# Patient Record
Sex: FEMALE | Race: WHITE | NOT HISPANIC OR LATINO | Employment: FULL TIME | URBAN - METROPOLITAN AREA
[De-identification: names, ages, dates, MRNs, and addresses within clinical notes are randomized per-mention and may not be internally consistent; named-entity substitution may affect disease eponyms.]

---

## 2017-04-06 ENCOUNTER — APPOINTMENT (OUTPATIENT)
Dept: PREADMISSION TESTING | Facility: HOSPITAL | Age: 47
End: 2017-04-06

## 2017-04-06 VITALS
WEIGHT: 160 LBS | BODY MASS INDEX: 28.35 KG/M2 | RESPIRATION RATE: 16 BRPM | HEIGHT: 63 IN | DIASTOLIC BLOOD PRESSURE: 78 MMHG | SYSTOLIC BLOOD PRESSURE: 123 MMHG | HEART RATE: 86 BPM | OXYGEN SATURATION: 99 % | TEMPERATURE: 98 F

## 2017-04-06 LAB
ANION GAP SERPL CALCULATED.3IONS-SCNC: 15.5 MMOL/L
BUN BLD-MCNC: 13 MG/DL (ref 6–20)
BUN/CREAT SERPL: 25 (ref 7–25)
CALCIUM SPEC-SCNC: 9.2 MG/DL (ref 8.6–10.5)
CHLORIDE SERPL-SCNC: 101 MMOL/L (ref 98–107)
CO2 SERPL-SCNC: 23.5 MMOL/L (ref 22–29)
CREAT BLD-MCNC: 0.52 MG/DL (ref 0.57–1)
DEPRECATED RDW RBC AUTO: 41.8 FL (ref 37–54)
ERYTHROCYTE [DISTWIDTH] IN BLOOD BY AUTOMATED COUNT: 12.4 % (ref 11.7–13)
GFR SERPL CREATININE-BSD FRML MDRD: 127 ML/MIN/1.73
GLUCOSE BLD-MCNC: 93 MG/DL (ref 65–99)
HCT VFR BLD AUTO: 39.3 % (ref 35.6–45.5)
HGB BLD-MCNC: 12.8 G/DL (ref 11.9–15.5)
MCH RBC QN AUTO: 30 PG (ref 26.9–32)
MCHC RBC AUTO-ENTMCNC: 32.6 G/DL (ref 32.4–36.3)
MCV RBC AUTO: 92.3 FL (ref 80.5–98.2)
PLATELET # BLD AUTO: 267 10*3/MM3 (ref 140–500)
PMV BLD AUTO: 9.6 FL (ref 6–12)
POTASSIUM BLD-SCNC: 3.8 MMOL/L (ref 3.5–5.2)
RBC # BLD AUTO: 4.26 10*6/MM3 (ref 3.9–5.2)
SODIUM BLD-SCNC: 140 MMOL/L (ref 136–145)
WBC NRBC COR # BLD: 6.88 10*3/MM3 (ref 4.5–10.7)

## 2017-04-06 PROCEDURE — 36415 COLL VENOUS BLD VENIPUNCTURE: CPT

## 2017-04-06 PROCEDURE — 85027 COMPLETE CBC AUTOMATED: CPT | Performed by: OTOLARYNGOLOGY

## 2017-04-06 PROCEDURE — 80048 BASIC METABOLIC PNL TOTAL CA: CPT | Performed by: OTOLARYNGOLOGY

## 2017-04-06 RX ORDER — METOPROLOL SUCCINATE 50 MG/1
100 TABLET, EXTENDED RELEASE ORAL EVERY MORNING
COMMUNITY

## 2017-04-06 RX ORDER — OMEPRAZOLE 20 MG/1
40 CAPSULE, DELAYED RELEASE ORAL EVERY MORNING
COMMUNITY

## 2017-04-06 NOTE — DISCHARGE INSTRUCTIONS
Take the following medications the morning of surgery with a small sip of water:    METOPROLOL  PRILOSEC      General Instructions:  • Do not eat solid food after midnight the night before surgery.  • You may drink clear liquids the day of surgery prior to leaving your house for the hospital.  Do not have anything once you have left your house for the hospital.  • You are to have nothing to drink two hours before surgery.  • Clear liquids are liquids you can see through. Nothing red in color.  Plain water    Sports drinks  Sodas     Gelatin (Jell-O)  Fruit juices without pulp such as white grape juice and apple juice  Popsicles that contain no fruit or yogurt  Tea or coffee (no cream or milk added)    • It is beneficial for you to have a clear drink that contains carbohydrates just before you leave your house and before your fasting time begins.  We suggest a 20 ounce bottle of Gatorade or Powerade for non-diabetic patients or a 20 ounce bottle of G2 or Powerade Zero for diabetic patients. (Pediatric patients will not be advised to drink a 20 ounce carbohydrate drink)  • Patients who avoid smoking, chewing tobacco and alcohol for 4 weeks prior to surgery have a reduced risk of post-operative complications.  Quit smoking as many days before surgery as you can.  • Do not smoke, use chewing tobacco or drink alcohol the day of surgery.   • Bring any papers given to you in the doctor’s office.  • Wear clean comfortable clothes and socks.  • Do not wear contact lenses or make-up.  Bring a case for your glasses.   • Bring crutches or walker if applicable.  • Leave all other valuables and jewelry at home.  • The Pre-Admission Testing nurse will instruct you to bring medications if unable to obtain an accurate list in Pre-Admission Testing.      Preventing a Surgical Site Infection:  • For 2 to 3 days before surgery, avoid shaving with a razor because the razor can irritate skin and make it easier to develop an  infection.  • The night prior to surgery sleep in a clean bed with clean clothing.  Do not allow pets to sleep with you.  • Shower on the morning of surgery using a fresh bar of anti-bacterial soap (such as Dial) and clean washcloth.  Dry with a clean towel and dress in clean clothing.  • Ask your surgeon if you will be receiving antibiotics prior to surgery.  • Make sure you, your family, and all healthcare providers clean their hands with soap and water or an alcohol based hand  before caring for you or your wound.    Day of surgery: 04- YOU WILL BE CALLED PRIOR TO SURGERY WITH AN ARRIVAL TIME--REPORT TO THE [facility] @ DESIGNATED TIME  Upon arrival, a Pre-op nurse and Anesthesiologist will review your health history, obtain vital signs, and answer questions you may have.  The only belongings needed at this time will be your home medications and if applicable your C-PAP/BI-PAP machine.  If you are staying overnight your family can leave the rest of your belongings in the car and bring them to your room later.  A Pre-op nurse will start an IV and you may receive medication in preparation for surgery, including something to help you relax.  Your family will be able to see you in the Pre-op area.  While you are in surgery your family should notify the waiting room  if they leave the waiting room area and provide a contact phone number.    Please be aware that surgery does come with discomfort.  We want to make every effort to control your discomfort so please discuss any uncontrolled symptoms with your nurse.   Your doctor will most likely have prescribed pain medications.      If you are going home after surgery you will receive individualized written care instructions before being discharged.  A responsible adult must drive you to and from the hospital on the day of your surgery and stay with you for 24 hours.    If you are staying overnight following surgery, you will be transported to your  hospital room following the recovery period.  [facility] has all private rooms.    If you have any questions please call Pre-Admission Testing at [phone].  Deductibles and co-payments are collected on the day of service. Please be prepared to pay the required co-pay, deductible or deposit on the day of service as defined by your plan.

## 2017-04-13 ENCOUNTER — HOSPITAL ENCOUNTER (OUTPATIENT)
Facility: HOSPITAL | Age: 47
Setting detail: OBSERVATION
Discharge: HOME | End: 2017-04-14
Attending: OTOLARYNGOLOGY | Admitting: OTOLARYNGOLOGY

## 2017-04-13 ENCOUNTER — ANESTHESIA (OUTPATIENT)
Dept: PERIOP | Facility: HOSPITAL | Age: 47
End: 2017-04-13

## 2017-04-13 ENCOUNTER — ANESTHESIA EVENT (OUTPATIENT)
Dept: PERIOP | Facility: HOSPITAL | Age: 47
End: 2017-04-13

## 2017-04-13 DIAGNOSIS — E04.1 THYROID NODULE: ICD-10-CM

## 2017-04-13 PROCEDURE — G0378 HOSPITAL OBSERVATION PER HR: HCPCS

## 2017-04-13 PROCEDURE — 25010000002 PROMETHAZINE PER 50 MG: Performed by: ANESTHESIOLOGY

## 2017-04-13 PROCEDURE — 88331 PATH CONSLTJ SURG 1 BLK 1SPC: CPT | Performed by: OTOLARYNGOLOGY

## 2017-04-13 PROCEDURE — 25010000002 PROPOFOL 10 MG/ML EMULSION: Performed by: NURSE ANESTHETIST, CERTIFIED REGISTERED

## 2017-04-13 PROCEDURE — 25010000002 HYDROMORPHONE PER 4 MG: Performed by: ANESTHESIOLOGY

## 2017-04-13 PROCEDURE — 25010000002 MIDAZOLAM PER 1 MG: Performed by: ANESTHESIOLOGY

## 2017-04-13 PROCEDURE — 25010000002 DEXAMETHASONE PER 1 MG: Performed by: NURSE ANESTHETIST, CERTIFIED REGISTERED

## 2017-04-13 PROCEDURE — 88307 TISSUE EXAM BY PATHOLOGIST: CPT | Performed by: OTOLARYNGOLOGY

## 2017-04-13 PROCEDURE — 25010000002 FENTANYL CITRATE (PF) 100 MCG/2ML SOLUTION: Performed by: NURSE ANESTHETIST, CERTIFIED REGISTERED

## 2017-04-13 PROCEDURE — 25010000002 NEOSTIGMINE 10 MG/10ML SOLUTION: Performed by: NURSE ANESTHETIST, CERTIFIED REGISTERED

## 2017-04-13 PROCEDURE — 25010000002 HYDROMORPHONE PER 4 MG: Performed by: NURSE ANESTHETIST, CERTIFIED REGISTERED

## 2017-04-13 PROCEDURE — 25010000003 AMPICILLIN-SULBACTAM PER 1.5 G: Performed by: NURSE ANESTHETIST, CERTIFIED REGISTERED

## 2017-04-13 RX ORDER — GLYCOPYRROLATE 0.2 MG/ML
INJECTION INTRAMUSCULAR; INTRAVENOUS AS NEEDED
Status: DISCONTINUED | OUTPATIENT
Start: 2017-04-13 | End: 2017-04-13 | Stop reason: SURG

## 2017-04-13 RX ORDER — HYDROCODONE BITARTRATE AND ACETAMINOPHEN 7.5; 325 MG/1; MG/1
1 TABLET ORAL EVERY 4 HOURS PRN
Status: DISCONTINUED | OUTPATIENT
Start: 2017-04-13 | End: 2017-04-14 | Stop reason: HOSPADM

## 2017-04-13 RX ORDER — OXYCODONE HYDROCHLORIDE AND ACETAMINOPHEN 5; 325 MG/1; MG/1
1 TABLET ORAL ONCE AS NEEDED
Status: DISCONTINUED | OUTPATIENT
Start: 2017-04-13 | End: 2017-04-13 | Stop reason: HOSPADM

## 2017-04-13 RX ORDER — NALBUPHINE HCL 10 MG/ML
10 AMPUL (ML) INJECTION EVERY 4 HOURS PRN
Status: DISCONTINUED | OUTPATIENT
Start: 2017-04-13 | End: 2017-04-13 | Stop reason: HOSPADM

## 2017-04-13 RX ORDER — PROMETHAZINE HYDROCHLORIDE 25 MG/ML
12.5 INJECTION, SOLUTION INTRAMUSCULAR; INTRAVENOUS EVERY 6 HOURS PRN
Status: DISCONTINUED | OUTPATIENT
Start: 2017-04-13 | End: 2017-04-14 | Stop reason: HOSPADM

## 2017-04-13 RX ORDER — DEXAMETHASONE SODIUM PHOSPHATE 10 MG/ML
INJECTION INTRAMUSCULAR; INTRAVENOUS AS NEEDED
Status: DISCONTINUED | OUTPATIENT
Start: 2017-04-13 | End: 2017-04-13 | Stop reason: SURG

## 2017-04-13 RX ORDER — FENTANYL CITRATE 50 UG/ML
INJECTION, SOLUTION INTRAMUSCULAR; INTRAVENOUS AS NEEDED
Status: DISCONTINUED | OUTPATIENT
Start: 2017-04-13 | End: 2017-04-13 | Stop reason: SURG

## 2017-04-13 RX ORDER — HYDROMORPHONE HCL 110MG/55ML
PATIENT CONTROLLED ANALGESIA SYRINGE INTRAVENOUS AS NEEDED
Status: DISCONTINUED | OUTPATIENT
Start: 2017-04-13 | End: 2017-04-13 | Stop reason: SURG

## 2017-04-13 RX ORDER — PANTOPRAZOLE SODIUM 40 MG/1
40 TABLET, DELAYED RELEASE ORAL
Status: DISCONTINUED | OUTPATIENT
Start: 2017-04-14 | End: 2017-04-14 | Stop reason: HOSPADM

## 2017-04-13 RX ORDER — NALBUPHINE HCL 10 MG/ML
2 AMPUL (ML) INJECTION EVERY 4 HOURS PRN
Status: DISCONTINUED | OUTPATIENT
Start: 2017-04-13 | End: 2017-04-13 | Stop reason: HOSPADM

## 2017-04-13 RX ORDER — FENTANYL CITRATE 50 UG/ML
50 INJECTION, SOLUTION INTRAMUSCULAR; INTRAVENOUS
Status: DISCONTINUED | OUTPATIENT
Start: 2017-04-13 | End: 2017-04-13 | Stop reason: HOSPADM

## 2017-04-13 RX ORDER — LIDOCAINE HYDROCHLORIDE 10 MG/ML
5 INJECTION, SOLUTION EPIDURAL; INFILTRATION; INTRACAUDAL; PERINEURAL ONCE
Status: DISCONTINUED | OUTPATIENT
Start: 2017-04-13 | End: 2017-04-13 | Stop reason: HOSPADM

## 2017-04-13 RX ORDER — HYDROMORPHONE HYDROCHLORIDE 1 MG/ML
0.5 INJECTION, SOLUTION INTRAMUSCULAR; INTRAVENOUS; SUBCUTANEOUS
Status: DISCONTINUED | OUTPATIENT
Start: 2017-04-13 | End: 2017-04-13 | Stop reason: HOSPADM

## 2017-04-13 RX ORDER — NALOXONE HCL 0.4 MG/ML
0.4 VIAL (ML) INJECTION AS NEEDED
Status: DISCONTINUED | OUTPATIENT
Start: 2017-04-13 | End: 2017-04-13 | Stop reason: HOSPADM

## 2017-04-13 RX ORDER — PROMETHAZINE HYDROCHLORIDE 25 MG/1
25 SUPPOSITORY RECTAL EVERY 4 HOURS PRN
Status: DISCONTINUED | OUTPATIENT
Start: 2017-04-13 | End: 2017-04-14 | Stop reason: HOSPADM

## 2017-04-13 RX ORDER — SODIUM CHLORIDE, SODIUM LACTATE, POTASSIUM CHLORIDE, CALCIUM CHLORIDE 600; 310; 30; 20 MG/100ML; MG/100ML; MG/100ML; MG/100ML
9 INJECTION, SOLUTION INTRAVENOUS CONTINUOUS
Status: DISCONTINUED | OUTPATIENT
Start: 2017-04-13 | End: 2017-04-14 | Stop reason: HOSPADM

## 2017-04-13 RX ORDER — SODIUM CHLORIDE 0.9 % (FLUSH) 0.9 %
1-10 SYRINGE (ML) INJECTION AS NEEDED
Status: DISCONTINUED | OUTPATIENT
Start: 2017-04-13 | End: 2017-04-14 | Stop reason: HOSPADM

## 2017-04-13 RX ORDER — ACETAMINOPHEN 325 MG/1
650 TABLET ORAL ONCE
Status: COMPLETED | OUTPATIENT
Start: 2017-04-13 | End: 2017-04-13

## 2017-04-13 RX ORDER — LIDOCAINE HYDROCHLORIDE 20 MG/ML
INJECTION, SOLUTION INFILTRATION; PERINEURAL AS NEEDED
Status: DISCONTINUED | OUTPATIENT
Start: 2017-04-13 | End: 2017-04-13 | Stop reason: SURG

## 2017-04-13 RX ORDER — ROCURONIUM BROMIDE 10 MG/ML
INJECTION, SOLUTION INTRAVENOUS AS NEEDED
Status: DISCONTINUED | OUTPATIENT
Start: 2017-04-13 | End: 2017-04-13 | Stop reason: SURG

## 2017-04-13 RX ORDER — MAGNESIUM HYDROXIDE 1200 MG/15ML
LIQUID ORAL AS NEEDED
Status: DISCONTINUED | OUTPATIENT
Start: 2017-04-13 | End: 2017-04-13 | Stop reason: HOSPADM

## 2017-04-13 RX ORDER — MIDAZOLAM HYDROCHLORIDE 1 MG/ML
2 INJECTION INTRAMUSCULAR; INTRAVENOUS
Status: DISCONTINUED | OUTPATIENT
Start: 2017-04-13 | End: 2017-04-13 | Stop reason: HOSPADM

## 2017-04-13 RX ORDER — DIPHENHYDRAMINE HYDROCHLORIDE 50 MG/ML
12.5 INJECTION INTRAMUSCULAR; INTRAVENOUS
Status: DISCONTINUED | OUTPATIENT
Start: 2017-04-13 | End: 2017-04-13 | Stop reason: HOSPADM

## 2017-04-13 RX ORDER — ACETAMINOPHEN 650 MG/1
650 SUPPOSITORY RECTAL ONCE AS NEEDED
Status: DISCONTINUED | OUTPATIENT
Start: 2017-04-13 | End: 2017-04-13 | Stop reason: HOSPADM

## 2017-04-13 RX ORDER — HYDRALAZINE HYDROCHLORIDE 20 MG/ML
5 INJECTION INTRAMUSCULAR; INTRAVENOUS
Status: DISCONTINUED | OUTPATIENT
Start: 2017-04-13 | End: 2017-04-13 | Stop reason: HOSPADM

## 2017-04-13 RX ORDER — PROMETHAZINE HYDROCHLORIDE 25 MG/ML
6.25 INJECTION, SOLUTION INTRAMUSCULAR; INTRAVENOUS ONCE AS NEEDED
Status: COMPLETED | OUTPATIENT
Start: 2017-04-13 | End: 2017-04-13

## 2017-04-13 RX ORDER — FAMOTIDINE 10 MG/ML
20 INJECTION, SOLUTION INTRAVENOUS ONCE
Status: COMPLETED | OUTPATIENT
Start: 2017-04-13 | End: 2017-04-13

## 2017-04-13 RX ORDER — MIDAZOLAM HYDROCHLORIDE 1 MG/ML
1 INJECTION INTRAMUSCULAR; INTRAVENOUS
Status: DISCONTINUED | OUTPATIENT
Start: 2017-04-13 | End: 2017-04-13 | Stop reason: HOSPADM

## 2017-04-13 RX ORDER — NEOSTIGMINE METHYLSULFATE 1 MG/ML
INJECTION, SOLUTION INTRAVENOUS AS NEEDED
Status: DISCONTINUED | OUTPATIENT
Start: 2017-04-13 | End: 2017-04-13 | Stop reason: SURG

## 2017-04-13 RX ORDER — SODIUM CHLORIDE 0.9 % (FLUSH) 0.9 %
1-10 SYRINGE (ML) INJECTION AS NEEDED
Status: DISCONTINUED | OUTPATIENT
Start: 2017-04-13 | End: 2017-04-13 | Stop reason: HOSPADM

## 2017-04-13 RX ORDER — AMPICILLIN SODIUM AND SULBACTAM SODIUM 2; 1 G/1; G/1
INJECTION, POWDER, FOR SOLUTION INTRAVENOUS AS NEEDED
Status: DISCONTINUED | OUTPATIENT
Start: 2017-04-13 | End: 2017-04-13 | Stop reason: SURG

## 2017-04-13 RX ORDER — PROMETHAZINE HYDROCHLORIDE 25 MG/1
25 SUPPOSITORY RECTAL ONCE AS NEEDED
Status: COMPLETED | OUTPATIENT
Start: 2017-04-13 | End: 2017-04-13

## 2017-04-13 RX ORDER — BACITRACIN ZINC 500 [USP'U]/G
OINTMENT TOPICAL 3 TIMES DAILY
Status: DISCONTINUED | OUTPATIENT
Start: 2017-04-13 | End: 2017-04-14 | Stop reason: HOSPADM

## 2017-04-13 RX ORDER — METOPROLOL SUCCINATE 50 MG/1
50 TABLET, EXTENDED RELEASE ORAL EVERY MORNING
Status: DISCONTINUED | OUTPATIENT
Start: 2017-04-14 | End: 2017-04-14 | Stop reason: HOSPADM

## 2017-04-13 RX ORDER — PROMETHAZINE HYDROCHLORIDE 25 MG/1
25 TABLET ORAL EVERY 4 HOURS PRN
Status: DISCONTINUED | OUTPATIENT
Start: 2017-04-13 | End: 2017-04-14 | Stop reason: HOSPADM

## 2017-04-13 RX ORDER — PROPOFOL 10 MG/ML
VIAL (ML) INTRAVENOUS AS NEEDED
Status: DISCONTINUED | OUTPATIENT
Start: 2017-04-13 | End: 2017-04-13 | Stop reason: SURG

## 2017-04-13 RX ORDER — LIDOCAINE HYDROCHLORIDE AND EPINEPHRINE 10; 10 MG/ML; UG/ML
INJECTION, SOLUTION INFILTRATION; PERINEURAL AS NEEDED
Status: DISCONTINUED | OUTPATIENT
Start: 2017-04-13 | End: 2017-04-13 | Stop reason: HOSPADM

## 2017-04-13 RX ORDER — PROMETHAZINE HYDROCHLORIDE 25 MG/1
25 TABLET ORAL ONCE AS NEEDED
Status: COMPLETED | OUTPATIENT
Start: 2017-04-13 | End: 2017-04-13

## 2017-04-13 RX ORDER — ACETAMINOPHEN 325 MG/1
650 TABLET ORAL ONCE AS NEEDED
Status: DISCONTINUED | OUTPATIENT
Start: 2017-04-13 | End: 2017-04-13 | Stop reason: HOSPADM

## 2017-04-13 RX ORDER — NALOXONE HCL 0.4 MG/ML
0.4 VIAL (ML) INJECTION
Status: DISCONTINUED | OUTPATIENT
Start: 2017-04-13 | End: 2017-04-14 | Stop reason: HOSPADM

## 2017-04-13 RX ADMIN — AMPICILLIN SODIUM AND SULBACTAM SODIUM 3 G: 2; 1 INJECTION, POWDER, FOR SOLUTION INTRAVENOUS at 13:50

## 2017-04-13 RX ADMIN — SODIUM CHLORIDE, POTASSIUM CHLORIDE, SODIUM LACTATE AND CALCIUM CHLORIDE 9 ML/HR: 600; 310; 30; 20 INJECTION, SOLUTION INTRAVENOUS at 11:41

## 2017-04-13 RX ADMIN — BACITRACIN ZINC: 500 OINTMENT TOPICAL at 23:35

## 2017-04-13 RX ADMIN — MIDAZOLAM 2 MG: 1 INJECTION INTRAMUSCULAR; INTRAVENOUS at 11:56

## 2017-04-13 RX ADMIN — FENTANYL CITRATE 100 MCG: 50 INJECTION INTRAMUSCULAR; INTRAVENOUS at 12:32

## 2017-04-13 RX ADMIN — GLYCOPYRROLATE 0.4 MG: 0.2 INJECTION INTRAMUSCULAR; INTRAVENOUS at 14:10

## 2017-04-13 RX ADMIN — FENTANYL CITRATE 50 MCG: 50 INJECTION INTRAMUSCULAR; INTRAVENOUS at 13:18

## 2017-04-13 RX ADMIN — SODIUM CHLORIDE, POTASSIUM CHLORIDE, SODIUM LACTATE AND CALCIUM CHLORIDE: 600; 310; 30; 20 INJECTION, SOLUTION INTRAVENOUS at 13:50

## 2017-04-13 RX ADMIN — PROPOFOL 160 MG: 10 INJECTION, EMULSION INTRAVENOUS at 12:32

## 2017-04-13 RX ADMIN — ROCURONIUM BROMIDE 20 MG: 10 INJECTION INTRAVENOUS at 13:20

## 2017-04-13 RX ADMIN — SODIUM CHLORIDE, POTASSIUM CHLORIDE, SODIUM LACTATE AND CALCIUM CHLORIDE 9 ML/HR: 600; 310; 30; 20 INJECTION, SOLUTION INTRAVENOUS at 19:39

## 2017-04-13 RX ADMIN — HYDROCODONE BITARTRATE AND ACETAMINOPHEN 1 TABLET: 7.5; 325 TABLET ORAL at 20:52

## 2017-04-13 RX ADMIN — HYDROMORPHONE HYDROCHLORIDE 0.5 MG: 1 INJECTION, SOLUTION INTRAMUSCULAR; INTRAVENOUS; SUBCUTANEOUS at 16:18

## 2017-04-13 RX ADMIN — AMPICILLIN SODIUM AND SULBACTAM SODIUM 3 G: 2; 1 INJECTION, POWDER, FOR SOLUTION INTRAMUSCULAR; INTRAVENOUS at 19:39

## 2017-04-13 RX ADMIN — PROMETHAZINE HYDROCHLORIDE 3.75 MG: 25 INJECTION, SOLUTION INTRAMUSCULAR; INTRAVENOUS at 16:59

## 2017-04-13 RX ADMIN — HYDROMORPHONE HYDROCHLORIDE 0.5 MG: 2 INJECTION, SOLUTION INTRAMUSCULAR; INTRAVENOUS; SUBCUTANEOUS at 14:25

## 2017-04-13 RX ADMIN — HYDROMORPHONE HYDROCHLORIDE 0.5 MG: 1 INJECTION, SOLUTION INTRAMUSCULAR; INTRAVENOUS; SUBCUTANEOUS at 14:51

## 2017-04-13 RX ADMIN — HYDROMORPHONE HYDROCHLORIDE 0.5 MG: 2 INJECTION, SOLUTION INTRAMUSCULAR; INTRAVENOUS; SUBCUTANEOUS at 14:35

## 2017-04-13 RX ADMIN — DEXAMETHASONE SODIUM PHOSPHATE 8 MG: 10 INJECTION INTRAMUSCULAR; INTRAVENOUS at 12:50

## 2017-04-13 RX ADMIN — NEOSTIGMINE METHYLSULFATE 2 MG: 1 INJECTION INTRAVENOUS at 14:10

## 2017-04-13 RX ADMIN — HYDROMORPHONE HYDROCHLORIDE 0.5 MG: 1 INJECTION, SOLUTION INTRAMUSCULAR; INTRAVENOUS; SUBCUTANEOUS at 15:09

## 2017-04-13 RX ADMIN — FENTANYL CITRATE 50 MCG: 50 INJECTION INTRAMUSCULAR; INTRAVENOUS at 13:22

## 2017-04-13 RX ADMIN — ROCURONIUM BROMIDE 30 MG: 10 INJECTION INTRAVENOUS at 12:32

## 2017-04-13 RX ADMIN — ACETAMINOPHEN 650 MG: 325 TABLET ORAL at 11:53

## 2017-04-13 RX ADMIN — LIDOCAINE HYDROCHLORIDE 80 MG: 20 INJECTION, SOLUTION INFILTRATION; PERINEURAL at 12:32

## 2017-04-13 RX ADMIN — FAMOTIDINE 20 MG: 10 INJECTION, SOLUTION INTRAVENOUS at 11:54

## 2017-04-13 NOTE — BRIEF OP NOTE
THYROIDECTOMY  Procedure Note    Enid Baron  4/13/2017    Pre-op Diagnosis:   Mediastinal thyroid mass    Post-op Diagnosis:     SAME    Procedure/CPT® Codes:      Procedure(s):   LEFT THYROID LOBECTOMY W REMOVAL OF SUBSTERNAL THYROID MASS    Surgeon(s):  MD Jose Mendoza MD    Anesthesia: General    Staff:   Circulator: Kierra Castañeda RN  Scrub Person: Severiano Barnhart    Estimated Blood Loss: 150 mL  Urine Voided: * No values recorded between 4/13/2017 12:21 PM and 4/13/2017  2:29 PM *    Specimens:                  ID Type Source Tests Collected by Time Destination   A : LEFT THYROID LOBE AND ISTHMUS NODULE Tissue Thyroid TISSUE EXAM Monster De Oliveira MD 4/13/2017 1258    B : SUBSTERNAL MASS QUESTIONABLE THYROID TISSUE Tissue Thyroid TISSUE EXAM Monster De Oliveira MD 4/13/2017 1336          Drains:   Drain/Device Site 04/13/17 1408 midline neck collapsible closed device [id number] (Active)           Findings: normal left lobe with a 3 cm isthmus nodule. Much larger (estimated 6 cm substernal thyroid mass). Parathyroid gland and RLN visualized in the neck near insertion into the larynx.    Complications: none immediate    OPERATIVE REPORT: The patient was taken to the operating room placed in the supine position and placed under general endotracheal anesthesia.  The skin incision was planned in a natural skin crease and injected with 1% lidocaine with epinephrine.  Sterile prep and drape with Hibiclens was performed.  The incision was then incised with a 15 blade scalpel.  This was carried through the subcutaneous fat and platysma.  Subplatysmal flaps were elevated.  The midline raphae was then found and the strap muscles were reflected laterally over the left thyroid lobe only.  Careful dissection on the capsule of the gland was performed.  The Harmonic scalpel was used to take down the blood supply superiorly, inferiorly and laterally.  As the thyroid was reflected  anteromedially, a parathyroid gland was noted and deep to this was the recurrent laryngeal nerve. The attachments at Berry's ligament were divided using the Harmonic scapel.  The isthmus nodule was noted and dissected free from the trachea and the vascular supply was taken down with the Harmonic scalpel.  The final attachments at the trachea were taken down and the isthmus divided using the Harmonic scalpel.  Further exploration inferiorly in the substernal area showed a much larger thyroid tissue appearing mass.  The superior aspect of this was dissected free with the nerve dissector and Harmonic scalpel.  The more inferior aspect had to be bluntly dissected due to the large size and limited access.  The specimen was fragmented due to these challenges. Frozen section favored benign on both specimens.   Irrigation of the operative site was performed and hemostasis was assured using the bipolar electrocautery.  Floseal and Surgicel was placed substernally.   #10 flat ALLIE drain was placed exiting through separate skin incision.  The midline raphae was then reapproximated with 3-0 Vicryl suture.  A 4-0 Vicryl suture was used for platysmal and dermal closure.   5-0 nylon was then used to close the epidermis in a vertical mattress fashion.   Bacitracin was then applied.  At this point the procedure was complete.  The patient was allowed to awake from anesthesia and taken to the recovery room in satisfactory condition.        Monster De Oliveira MD     Date: 4/13/2017  Time: 2:29 PM

## 2017-04-13 NOTE — ANESTHESIA PROCEDURE NOTES
Airway  Urgency: elective    Date/Time: 4/13/2017 12:32 PM  Airway not difficult    General Information and Staff    Patient location during procedure: OR  Anesthesiologist: RENDER, LAN RAY  CRNA: WILL LOWE    Indications and Patient Condition  Indications for airway management: airway protection    Preoxygenated: yes  MILS not maintained throughout  Mask difficulty assessment: 1 - vent by mask    Final Airway Details  Final airway type: endotracheal airway      Successful airway: ETT  Cuffed: yes   Successful intubation technique: direct laryngoscopy  Endotracheal tube insertion site: oral  Blade: Dwayne  Blade size: #3  ETT size: 7.0 mm  Cormack-Lehane Classification: grade I - full view of glottis  Placement verified by: chest auscultation and capnometry   Measured from: lips  ETT to lips (cm): 21  Number of attempts at approach: 1    Additional Comments  Atraumatic. Dentition intact and unchanged

## 2017-04-13 NOTE — PERIOPERATIVE NURSING NOTE
Slight swelling noted on left side of incision. OR nurse states was not there prevviously. Dr De Oliveira informed, checked pt states is where the drain is located. No new orders

## 2017-04-13 NOTE — PLAN OF CARE
Problem: Patient Care Overview (Adult)  Goal: Plan of Care Review  Outcome: Ongoing (interventions implemented as appropriate)    04/13/17 8673   Coping/Psychosocial Response Interventions   Plan Of Care Reviewed With patient   Patient Care Overview   Progress improving   Outcome Evaluation   Outcome Summary/Follow up Plan rec/d from [facility] s/p L thyroidectomy. ant neck inc w sutures intact, david to L neck with mod amt sang dng. voided without diff. vss. on RA and sats ok. enc db. family at bedside.        Goal: Adult Individualization and Mutuality  Outcome: Ongoing (interventions implemented as appropriate)  Goal: Discharge Needs Assessment  Outcome: Ongoing (interventions implemented as appropriate)    Problem: Perioperative Period (Adult)  Goal: Signs and Symptoms of Listed Potential Problems Will be Absent or Manageable (Perioperative Period)  Outcome: Ongoing (interventions implemented as appropriate)

## 2017-04-13 NOTE — ANESTHESIA PREPROCEDURE EVALUATION
Anesthesia Evaluation        Airway   Mallampati: II  TM distance: >3 FB  Neck ROM: full  no difficulty expected  Dental - normal exam     Pulmonary - normal exam   Cardiovascular     Rhythm: regular    (+) hypertension,   (-) murmur      Neuro/Psych  GI/Hepatic/Renal/Endo - negative ROS     Musculoskeletal (-) negative ROS    Abdominal  - normal exam   Substance History      OB/GYN          Other                                    Anesthesia Plan    ASA 2     general   (  D/W R&B of GA including but not limited to: heart, lung, liver, kidney, neurologic problems, positioning injuries, dental damage, corneal abrasion and TMJ.  .)  intravenous induction

## 2017-04-13 NOTE — PLAN OF CARE
Problem: Patient Care Overview (Adult)  Goal: Plan of Care Review  Outcome: Ongoing (interventions implemented as appropriate)    04/13/17 1128   Coping/Psychosocial Response Interventions   Plan Of Care Reviewed With patient;spouse   Patient Care Overview   Progress improving       Goal: Adult Individualization and Mutuality  Outcome: Ongoing (interventions implemented as appropriate)  Goal: Discharge Needs Assessment  Outcome: Ongoing (interventions implemented as appropriate)    04/13/17 1128   Discharge Needs Assessment   Concerns To Be Addressed no discharge needs identified

## 2017-04-13 NOTE — ANESTHESIA POSTPROCEDURE EVALUATION
Patient: Enid Baron    Procedure Summary     Date Anesthesia Start Anesthesia Stop Room / Location    04/13/17 3790 [id number]  [facility] /  [facility]       Procedure Diagnosis Surgeon Provider     LEFT THYROID LOBECTOMY W REMOVAL OF SUBSTERNAL ISTHMUS MASS (N/A Neck) No diagnosis on file. Monster De Oliveira MD Asher Héctor Coronado MD          Anesthesia Type: general  Last vitals  /82 (04/13/17 1600)    Temp      Pulse 96 (04/13/17 1600)   Resp 16 (04/13/17 1600)    SpO2 96 % (04/13/17 1600)      Post Anesthesia Care and Evaluation    Patient location during evaluation: PACU  Patient participation: complete - patient participated  Level of consciousness: awake and alert  Pain management: adequate  Airway patency: patent  Anesthetic complications: No anesthetic complications    Cardiovascular status: acceptable  Respiratory status: acceptable  Hydration status: acceptable

## 2017-04-14 ENCOUNTER — DOCUMENTATION (OUTPATIENT)
Dept: OTOLARYNGOLOGY | Facility: HOSPITAL | Age: 47
End: 2017-04-14

## 2017-04-14 VITALS
OXYGEN SATURATION: 100 % | HEART RATE: 86 BPM | SYSTOLIC BLOOD PRESSURE: 117 MMHG | HEIGHT: 63 IN | DIASTOLIC BLOOD PRESSURE: 79 MMHG | WEIGHT: 156 LBS | TEMPERATURE: 97.7 F | RESPIRATION RATE: 16 BRPM | BODY MASS INDEX: 27.64 KG/M2

## 2017-04-14 LAB
CYTO UR: NORMAL
LAB AP CASE REPORT: NORMAL
Lab: NORMAL
Lab: NORMAL
PATH REPORT.FINAL DX SPEC: NORMAL
PATH REPORT.GROSS SPEC: NORMAL

## 2017-04-14 PROCEDURE — G0378 HOSPITAL OBSERVATION PER HR: HCPCS

## 2017-04-14 RX ORDER — FLUCONAZOLE 150 MG/1
150 TABLET ORAL ONCE
Qty: 1 TABLET | Refills: 1 | Status: SHIPPED | OUTPATIENT
Start: 2017-04-14 | End: 2017-04-14

## 2017-04-14 RX ORDER — HYDROCODONE BITARTRATE AND ACETAMINOPHEN 5; 325 MG/1; MG/1
1-2 TABLET ORAL EVERY 4 HOURS PRN
Qty: 20 TABLET | Refills: 0 | Status: SHIPPED | OUTPATIENT
Start: 2017-04-14

## 2017-04-14 RX ORDER — AMOXICILLIN AND CLAVULANATE POTASSIUM 875; 125 MG/1; MG/1
1 TABLET, FILM COATED ORAL 2 TIMES DAILY
Qty: 20 TABLET | Refills: 0 | Status: SHIPPED | OUTPATIENT
Start: 2017-04-14 | End: 2017-04-24

## 2017-04-14 RX ADMIN — PANTOPRAZOLE SODIUM 40 MG: 40 TABLET, DELAYED RELEASE ORAL at 06:31

## 2017-04-14 RX ADMIN — METOPROLOL SUCCINATE 50 MG: 50 TABLET, FILM COATED, EXTENDED RELEASE ORAL at 08:05

## 2017-04-14 RX ADMIN — AMPICILLIN SODIUM AND SULBACTAM SODIUM 3 G: 2; 1 INJECTION, POWDER, FOR SOLUTION INTRAMUSCULAR; INTRAVENOUS at 00:41

## 2017-04-14 RX ADMIN — AMPICILLIN SODIUM AND SULBACTAM SODIUM 3 G: 2; 1 INJECTION, POWDER, FOR SOLUTION INTRAMUSCULAR; INTRAVENOUS at 06:31

## 2017-04-14 RX ADMIN — BACITRACIN ZINC: 500 OINTMENT TOPICAL at 11:00

## 2017-04-14 NOTE — PROGRESS NOTES
Continued Stay Note  [facility]     Patient Name: Enid Baron  MRN: [id number]  Today's Date: 4/14/2017    Admit Date: 4/13/2017          Discharge Plan       04/14/17 1437    Final Note    Final Note DC to home no needs              Discharge Codes       04/14/17 1437    Discharge Codes    Discharge Codes 01  Discharge to home        Expected Discharge Date and Time     Expected Discharge Date Expected Discharge Time    Apr 14, 2017             Brooke Marshall RN

## 2017-04-14 NOTE — PROGRESS NOTES
Patient was seen and examined this afternoon.    Doing very well post op.    Voice normal, swallowing normal.    Incision clean and intact.    Drain output appropriate.    A/P  Will d/c per orders.    Dictation number [id number]

## 2017-04-14 NOTE — DISCHARGE SUMMARY
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     PROCEDURE: On 04/13/2017  the patient underwent left thyroid lobectomy with removal of large substernal thyroid mass.     HISTORY OF PRESENT ILLNESS: The patient is a 47-year-old female with a history of a substernal mass noted on CT scan that was rather large. This was felt to be thyroid tissue and it was felt to benefit from thyroid lobectomy and removal of the thyroid mass.     HOSPITAL COURSE: The patient was admitted after a challenging but successful surgery to remove her left thyroid lobe and substernal thyroid mass. Pathology was felt to be benign on frozen section. She remained afebrile with stable vital signs throughout her hospital stay. She was kept on IV antibiotics given the work in the mediastinum. Her drain output was appropriate. Her voice was normal. She was felt to be in good condition for discharge home on 04/14/2017. She will be sent home with Augmentin as well as Diflucan. She was given some pain medication in the form of Norco as well. She will return on Monday for drain removal. She will call if there are further problems in the meantime.       Monster De Oliveira M.D.*  CPS:ab  D:   04/14/2017 15:43:38  T:   04/14/2017 16:20:57  Job ID:   [id number]  Document ID:   [id number]  cc:

## 2017-04-14 NOTE — PLAN OF CARE
Problem: Patient Care Overview (Adult)  Goal: Plan of Care Review  Outcome: Outcome(s) achieved Date Met:  04/14/17 04/14/17 1332   Coping/Psychosocial Response Interventions   Plan Of Care Reviewed With patient;spouse   Patient Care Overview   Progress progress toward functional goals as expected   Outcome Evaluation   Outcome Summary/Follow up Plan instructed in drain care, verbal understanding, supplies given, pain well controlled, incision dry & intact, pt d/c'd home       Goal: Adult Individualization and Mutuality  Outcome: Outcome(s) achieved Date Met:  04/14/17  Goal: Discharge Needs Assessment  Outcome: Outcome(s) achieved Date Met:  04/14/17    Problem: Perioperative Period (Adult)  Goal: Signs and Symptoms of Listed Potential Problems Will be Absent or Manageable (Perioperative Period)  Outcome: Outcome(s) achieved Date Met:  04/14/17    Problem: Pain, Acute (Adult)  Goal: Acceptable Pain Control/Comfort Level  Outcome: Outcome(s) achieved Date Met:  04/14/17

## 2017-07-12 NOTE — PLAN OF CARE
----- Message from Sienna Puckett sent at 7/12/2017  3:51 PM EDT -----  Regarding: LAURA - BLOOD   Contact: [phone]  FORREST, DAUGHTER CALLED AND SAID PATIENT GOT BLOOD WORK TODAY. SHE IS SCHEDULED FOR 8 AM FRIDAY MORNING TO RECEIVE BLOOD.     PATIENT DOESN'T THINK ITS BEEN TYPED, OR IT WON'T BE TIME TO RECEIVE BLOOD FRIDAY     PLEASE CALL FORREST BACK. [phone]   Problem: Patient Care Overview (Adult)  Goal: Plan of Care Review  Outcome: Ongoing (interventions implemented as appropriate)    04/14/17 0533   Coping/Psychosocial Response Interventions   Plan Of Care Reviewed With patient   Patient Care Overview   Progress improving   Outcome Evaluation   Outcome Summary/Follow up Plan incision d/i,minimal amount from david. voiding okay. refused pain med       Goal: Adult Individualization and Mutuality  Outcome: Ongoing (interventions implemented as appropriate)  Goal: Discharge Needs Assessment  Outcome: Ongoing (interventions implemented as appropriate)    Problem: Perioperative Period (Adult)  Goal: Signs and Symptoms of Listed Potential Problems Will be Absent or Manageable (Perioperative Period)  Outcome: Ongoing (interventions implemented as appropriate)    Problem: Pain, Acute (Adult)  Goal: Identify Related Risk Factors and Signs and Symptoms  Outcome: Outcome(s) achieved Date Met:  04/14/17  Goal: Acceptable Pain Control/Comfort Level  Outcome: Ongoing (interventions implemented as appropriate)

## 2019-11-21 ENCOUNTER — APPOINTMENT (OUTPATIENT)
Dept: CT IMAGING | Facility: HOSPITAL | Age: 49
End: 2019-11-21

## 2019-11-21 ENCOUNTER — HOSPITAL ENCOUNTER (EMERGENCY)
Facility: HOSPITAL | Age: 49
Discharge: HOME | End: 2019-11-21
Attending: EMERGENCY MEDICINE | Admitting: EMERGENCY MEDICINE

## 2019-11-21 VITALS
OXYGEN SATURATION: 97 % | WEIGHT: 165 LBS | RESPIRATION RATE: 18 BRPM | BODY MASS INDEX: 29.23 KG/M2 | HEIGHT: 63 IN | SYSTOLIC BLOOD PRESSURE: 122 MMHG | TEMPERATURE: 98.4 F | HEART RATE: 78 BPM | DIASTOLIC BLOOD PRESSURE: 66 MMHG

## 2019-11-21 DIAGNOSIS — R07.89 ATYPICAL CHEST PAIN: Primary | ICD-10-CM

## 2019-11-21 LAB
ALBUMIN SERPL-MCNC: 4.8 G/DL (ref 3.5–5.2)
ALBUMIN/GLOB SERPL: 1.5 G/DL
ALP SERPL-CCNC: 80 U/L (ref 39–117)
ALT SERPL W P-5'-P-CCNC: 19 U/L (ref 1–33)
ANION GAP SERPL CALCULATED.3IONS-SCNC: 17.8 MMOL/L (ref 5–15)
AST SERPL-CCNC: 16 U/L (ref 1–32)
BASOPHILS # BLD AUTO: 0.06 10*3/MM3 (ref 0–0.2)
BASOPHILS NFR BLD AUTO: 0.7 % (ref 0–1.5)
BILIRUB SERPL-MCNC: 0.6 MG/DL (ref 0.2–1.2)
BUN BLD-MCNC: 9 MG/DL (ref 6–20)
BUN/CREAT SERPL: 14.1 (ref 7–25)
CALCIUM SPEC-SCNC: 9.7 MG/DL (ref 8.6–10.5)
CHLORIDE SERPL-SCNC: 100 MMOL/L (ref 98–107)
CO2 SERPL-SCNC: 20.2 MMOL/L (ref 22–29)
CREAT BLD-MCNC: 0.64 MG/DL (ref 0.57–1)
D DIMER PPP FEU-MCNC: 0.51 MCGFEU/ML (ref 0–0.49)
DEPRECATED RDW RBC AUTO: 39.9 FL (ref 37–54)
EOSINOPHIL # BLD AUTO: 0.11 10*3/MM3 (ref 0–0.4)
EOSINOPHIL NFR BLD AUTO: 1.2 % (ref 0.3–6.2)
ERYTHROCYTE [DISTWIDTH] IN BLOOD BY AUTOMATED COUNT: 12.2 % (ref 12.3–15.4)
GFR SERPL CREATININE-BSD FRML MDRD: 99 ML/MIN/1.73
GLOBULIN UR ELPH-MCNC: 3.3 GM/DL
GLUCOSE BLD-MCNC: 82 MG/DL (ref 65–99)
HCT VFR BLD AUTO: 40.7 % (ref 34–46.6)
HGB BLD-MCNC: 13.7 G/DL (ref 12–15.9)
HOLD SPECIMEN: NORMAL
HOLD SPECIMEN: NORMAL
IMM GRANULOCYTES # BLD AUTO: 0.03 10*3/MM3 (ref 0–0.05)
IMM GRANULOCYTES NFR BLD AUTO: 0.3 % (ref 0–0.5)
LYMPHOCYTES # BLD AUTO: 2.54 10*3/MM3 (ref 0.7–3.1)
LYMPHOCYTES NFR BLD AUTO: 28.6 % (ref 19.6–45.3)
MCH RBC QN AUTO: 30.2 PG (ref 26.6–33)
MCHC RBC AUTO-ENTMCNC: 33.7 G/DL (ref 31.5–35.7)
MCV RBC AUTO: 89.6 FL (ref 79–97)
MONOCYTES # BLD AUTO: 0.65 10*3/MM3 (ref 0.1–0.9)
MONOCYTES NFR BLD AUTO: 7.3 % (ref 5–12)
NEUTROPHILS # BLD AUTO: 5.48 10*3/MM3 (ref 1.7–7)
NEUTROPHILS NFR BLD AUTO: 61.9 % (ref 42.7–76)
NRBC BLD AUTO-RTO: 0 /100 WBC (ref 0–0.2)
PLATELET # BLD AUTO: 314 10*3/MM3 (ref 140–450)
PMV BLD AUTO: 9.3 FL (ref 6–12)
POTASSIUM BLD-SCNC: 3.8 MMOL/L (ref 3.5–5.2)
PROT SERPL-MCNC: 8.1 G/DL (ref 6–8.5)
RBC # BLD AUTO: 4.54 10*6/MM3 (ref 3.77–5.28)
SODIUM BLD-SCNC: 138 MMOL/L (ref 136–145)
TROPONIN T SERPL-MCNC: <0.01 NG/ML (ref 0–0.03)
TROPONIN T SERPL-MCNC: <0.01 NG/ML (ref 0–0.03)
WBC NRBC COR # BLD: 8.87 10*3/MM3 (ref 3.4–10.8)
WHOLE BLOOD HOLD SPECIMEN: NORMAL
WHOLE BLOOD HOLD SPECIMEN: NORMAL

## 2019-11-21 PROCEDURE — 99283 EMERGENCY DEPT VISIT LOW MDM: CPT

## 2019-11-21 PROCEDURE — 84484 ASSAY OF TROPONIN QUANT: CPT | Performed by: EMERGENCY MEDICINE

## 2019-11-21 PROCEDURE — 80053 COMPREHEN METABOLIC PANEL: CPT | Performed by: NURSE PRACTITIONER

## 2019-11-21 PROCEDURE — 93010 ELECTROCARDIOGRAM REPORT: CPT | Performed by: INTERNAL MEDICINE

## 2019-11-21 PROCEDURE — 96360 HYDRATION IV INFUSION INIT: CPT

## 2019-11-21 PROCEDURE — 93005 ELECTROCARDIOGRAM TRACING: CPT

## 2019-11-21 PROCEDURE — 0 IOPAMIDOL PER 1 ML: Performed by: NURSE PRACTITIONER

## 2019-11-21 PROCEDURE — 84484 ASSAY OF TROPONIN QUANT: CPT

## 2019-11-21 PROCEDURE — 99284 EMERGENCY DEPT VISIT MOD MDM: CPT

## 2019-11-21 PROCEDURE — 93005 ELECTROCARDIOGRAM TRACING: CPT | Performed by: EMERGENCY MEDICINE

## 2019-11-21 PROCEDURE — 71275 CT ANGIOGRAPHY CHEST: CPT

## 2019-11-21 PROCEDURE — 85379 FIBRIN DEGRADATION QUANT: CPT | Performed by: NURSE PRACTITIONER

## 2019-11-21 PROCEDURE — 85025 COMPLETE CBC W/AUTO DIFF WBC: CPT | Performed by: NURSE PRACTITIONER

## 2019-11-21 RX ORDER — ALUMINA, MAGNESIA, AND SIMETHICONE 2400; 2400; 240 MG/30ML; MG/30ML; MG/30ML
15 SUSPENSION ORAL ONCE
Status: COMPLETED | OUTPATIENT
Start: 2019-11-21 | End: 2019-11-21

## 2019-11-21 RX ORDER — SODIUM CHLORIDE 0.9 % (FLUSH) 0.9 %
10 SYRINGE (ML) INJECTION AS NEEDED
Status: DISCONTINUED | OUTPATIENT
Start: 2019-11-21 | End: 2019-11-21 | Stop reason: HOSPADM

## 2019-11-21 RX ORDER — LIDOCAINE HYDROCHLORIDE 20 MG/ML
15 SOLUTION OROPHARYNGEAL ONCE
Status: COMPLETED | OUTPATIENT
Start: 2019-11-21 | End: 2019-11-21

## 2019-11-21 RX ADMIN — LIDOCAINE HYDROCHLORIDE 15 ML: 20 SOLUTION ORAL; TOPICAL at 16:33

## 2019-11-21 RX ADMIN — IOPAMIDOL 95 ML: 755 INJECTION, SOLUTION INTRAVENOUS at 17:07

## 2019-11-21 RX ADMIN — ALUMINUM HYDROXIDE, MAGNESIUM HYDROXIDE, AND DIMETHICONE 15 ML: 400; 400; 40 SUSPENSION ORAL at 16:32

## 2019-11-21 RX ADMIN — SODIUM CHLORIDE 1000 ML: 9 INJECTION, SOLUTION INTRAVENOUS at 16:35

## 2019-11-21 NOTE — ED NOTES
Pt to ED for c/o left sided CP that started yesterday, radiates into her back.      Petrona Ya RN  11/21/19 1628

## 2019-11-21 NOTE — ED PROVIDER NOTES
"Pt is a 49 y.o. female who presents to the ED complaining of constant L-sided CP that started yesterday afternoon. The pt states the initial onset of pain was more of a \"dull\" sensation to the L upper chest and remained constant. Today, her pain increased suddenly, as she felt \"sharp\" and \"stabbing pain to her chest, which radiated down her L arm and to her back. Pt reports no alleviating or exacerbating factors. The pt admits to having similar  intermittent CP, for which she had a negative Stress test, one year ago. Prior Echo shows an EF of 60-65% with no other comments.    On exam,  Constitutional: NAD  HENT: normocephalic  Cardiovascular: HRRR  Pulmonary: normal effort, CTAB  Abdomen: soft, non-distended  Musculoskeletal: moves all extremities  Neurological: A&Ox3    EKG          EKG time: 1519  Rhythm/Rate: SR, 85  P waves and SD: narrow complex  QRS, axis: normal axis   ST and T waves: no acute process, normal QT     Interpreted Contemporaneously by me, independently viewed  No significant changes compared to prior 4/26/17      Labs and imaging reviewed.     Plan: Discussed w/pt results of CT Chest shows no acute process. Plan is to review prior records. Pt understands and agrees with the plan. All questions were answered.     2105 Prior medical records reviewed from Stress Test which is a normal perfusion study    2106 Patient is resting comfortably and in NAD. Patient is stable. BP- 122/66 HR- 79 Temp- 98.4 °F (36.9 °C) (Tympanic) O2 sat- 97%. Informed the patient repeat troponin is negative. Discussed the plan for d/c with f/u to Cardiology. Pt was instructed to return to the ED for further evaluation. Pt understands and agrees with the plan, all questions answered.  ED Course as of Nov 21 2217   Thu Nov 21, 2019 2111 9:11 PM  I have discussed at length with the patient the results of the tests.  I have a low index of suspicion that anything is serious occurring.  The symptoms are atypical for coronary " artery disease, pulmonary embolism, or thoracic aortic dissection.  The patient understands the limitations of testing and understands that we are unable to rule out a disease process 100%.  The patient feels comfortable and agrees in being discharged home.  The patient will will return if symptoms worsen, develop shortness of breath, weakness in extremities, or any concerns.  They will also follow up with cardiology as provided.    [MM]      ED Course User Index  [MM] Fuentes Adams MD         DIAGNOSIS  Final diagnoses:   Atypical chest pain     DISCHARGE    Patient discharged in stable condition.    Reviewed implications of results, diagnosis, meds, responsibility to follow up, warning signs and symptoms of possible worsening, potential complications and reasons to return to ER.    Patient/Family voiced understanding of above instructions.    Discussed plan for discharge, as there is no emergent indication for admission. Patient referred to primary care provider for BP management due to today's BP. Pt/family is agreeable and understands need for follow up and repeat testing.  Pt is aware that discharge does not mean that nothing is wrong but it indicates no emergency is present that requires admission and they must continue care with follow-up as given below or physician of their choice.     FOLLOW-UP  Matthew Shen MD  [address] [address]  [phone]      Call tomorrow to arrange follow-up in the next couple days.  Return if pain worsens, shortness of breath, fever, or any concerns.         Medication List      No changes were made to your prescriptions during this visit.           MD ATTESTATION NOTE    The FILIPPO and I have discussed this patient's history, physical exam, and treatment plan.  I have reviewed the documentation and personally had a face to face interaction with the patient. I affirm the documentation and agree with the treatment and plan.  The attached note describes my  personal findings.      Documentation assistance provided by asmita Jones for Dr. Fuentes Adams. Information recorded by the scribe was done at my direction and has been verified and validated by me.       Robert Beasley  11/21/19 2125       Fuentes Adams MD  11/21/19 2216

## 2019-11-22 NOTE — DISCHARGE INSTRUCTIONS
-[facility].     -Follow up with Grant Felton -[phone] , call to make an appointment for ED follow up and further evaluation.     -Return to the ER for any worsening of your symptoms, fever, chest pain, dizziness, headache, weakness, fever, any new or other concerning symptoms.     We encourage all patients to follow up with your primary care provider for blood pressure recheck.  If you are a smoker, work on decreasing and stopping tobacco use. Follow up with your PCP to discuss medications that may assist in tobacco cessation if interested.

## (undated) DEVICE — MAGNETIC DRAPE: Brand: DEVON

## (undated) DEVICE — DRN JP RND NO TROC SIL 10F 1/8IN

## (undated) DEVICE — SUT ETHLN 5/0 PS2 18IN 1666H

## (undated) DEVICE — IRRIGATOR BULB ASEPTO 60CC STRL

## (undated) DEVICE — SUT SILK 3/0 FS1 18IN 684H

## (undated) DEVICE — UNDYED BRAIDED (POLYGLACTIN 910), SYNTHETIC ABSORBABLE SUTURE: Brand: COATED VICRYL

## (undated) DEVICE — PK ENT 40

## (undated) DEVICE — GLV SURG BIOGEL LTX PF 7 1/2

## (undated) DEVICE — TOWEL,OR,DSP,ST,BLUE,STD,4/PK,20PK/CS: Brand: MEDLINE

## (undated) DEVICE — DRN JP FLT NO TROC SIL 3/4PERF 10MM

## (undated) DEVICE — JACKSON-PRATT 100CC BULB RESERVOIR: Brand: CARDINAL HEALTH

## (undated) DEVICE — HS FOCUS 17 CM PLUS ADAPTIVE: Brand: HARMONIC

## (undated) DEVICE — 3M™ STERI-DRAPE™ INSTRUMENT POUCH 1018: Brand: STERI-DRAPE™

## (undated) DEVICE — STANDARD HYPODERMIC NEEDLE,POLYPROPYLENE HUB: Brand: MONOJECT

## (undated) DEVICE — DISPOSABLE BIPOLAR CABLE 12FT. (3.6M): Brand: KIRWAN

## (undated) DEVICE — SKIN PREP TRAY W/CHG: Brand: MEDLINE INDUSTRIES, INC.

## (undated) DEVICE — SUT VIC 3/0 SH 27IN J416H

## (undated) DEVICE — GAUZE,SPONGE,4"X4",16PLY,XRAY,STRL,LF: Brand: MEDLINE

## (undated) DEVICE — GOWN,SLEEVE,STERILE,W/CSR WRAP,1/P: Brand: MEDLINE